# Patient Record
Sex: MALE | Race: AMERICAN INDIAN OR ALASKA NATIVE
[De-identification: names, ages, dates, MRNs, and addresses within clinical notes are randomized per-mention and may not be internally consistent; named-entity substitution may affect disease eponyms.]

---

## 2018-09-26 ENCOUNTER — HOSPITAL ENCOUNTER (EMERGENCY)
Dept: HOSPITAL 14 - H.ER | Age: 28
LOS: 1 days | Discharge: HOME | End: 2018-09-27
Payer: MEDICAID

## 2018-09-26 ENCOUNTER — HOSPITAL ENCOUNTER (EMERGENCY)
Dept: HOSPITAL 14 - H.ER | Age: 28
Discharge: LEFT BEFORE DISCHARGE | End: 2018-09-26
Payer: MEDICAID

## 2018-09-26 VITALS
HEART RATE: 102 BPM | OXYGEN SATURATION: 98 % | SYSTOLIC BLOOD PRESSURE: 119 MMHG | RESPIRATION RATE: 16 BRPM | DIASTOLIC BLOOD PRESSURE: 69 MMHG

## 2018-09-26 VITALS — TEMPERATURE: 98 F

## 2018-09-26 DIAGNOSIS — R10.32: Primary | ICD-10-CM

## 2018-09-26 DIAGNOSIS — R11.10: ICD-10-CM

## 2018-09-26 DIAGNOSIS — R11.2: ICD-10-CM

## 2018-09-26 DIAGNOSIS — R10.9: Primary | ICD-10-CM

## 2018-09-26 DIAGNOSIS — M54.9: ICD-10-CM

## 2018-09-26 PROCEDURE — 96360 HYDRATION IV INFUSION INIT: CPT

## 2018-09-26 PROCEDURE — 81003 URINALYSIS AUTO W/O SCOPE: CPT

## 2018-09-26 PROCEDURE — 74177 CT ABD & PELVIS W/CONTRAST: CPT

## 2018-09-26 PROCEDURE — 83992 ASSAY FOR PHENCYCLIDINE: CPT

## 2018-09-26 PROCEDURE — 80345 DRUG SCREENING BARBITURATES: CPT

## 2018-09-26 PROCEDURE — 99283 EMERGENCY DEPT VISIT LOW MDM: CPT

## 2018-09-26 PROCEDURE — 80346 BENZODIAZEPINES1-12: CPT

## 2018-09-26 PROCEDURE — 80353 DRUG SCREENING COCAINE: CPT

## 2018-09-26 PROCEDURE — 80324 DRUG SCREEN AMPHETAMINES 1/2: CPT

## 2018-09-26 PROCEDURE — 80349 CANNABINOIDS NATURAL: CPT

## 2018-09-26 PROCEDURE — 80361 OPIATES 1 OR MORE: CPT

## 2018-09-26 PROCEDURE — 85025 COMPLETE CBC W/AUTO DIFF WBC: CPT

## 2018-09-26 PROCEDURE — 80053 COMPREHEN METABOLIC PANEL: CPT

## 2018-09-26 PROCEDURE — 80358 DRUG SCREENING METHADONE: CPT

## 2018-09-26 NOTE — ED PDOC
HPI: Abdomen


Time Seen by Provider: 09/26/18 18:30


Chief Complaint (Nursing): Abdominal Pain


Chief Complaint (Provider): Abdominal Pain


History Per: Patient


History/Exam Limitations: no limitations


Onset/Duration Of Symptoms: Hrs (x3)


Additional Complaint(s): 





Patient is a 27 y/o male who presents to the ED for evaluation of intermittent 

abdominal pain with associated non bloody non bilious vomiting since 17:00. 

Patient states he has a history of abdominal hernia and is unsure if the 

symptoms are related. Patient is also complaining of upper back pain that has 

been worsening throughout the week. He denies fall, trauma, fever, chills, 

diarrhea, urinary symptoms, weakness, numbness, cough, shortness of breath, or 

saddle anesthesia. He denies taking meds prior to arrival. 





PMD: None





Past Medical History


Reviewed: Historical Data, Nursing Documentation, Vital Signs


Vital Signs: 





                                Last Vital Signs











Temp  98.0 F   09/26/18 18:05


 


Pulse  102 H  09/26/18 18:05


 


Resp  16   09/26/18 18:05


 


BP  119/69   09/26/18 18:05


 


Pulse Ox  98   09/26/18 18:05














- Medical History


PMH: Asthma





- Surgical History


Other surgeries: ORIF of right hand





- Family History


Family History: States: Unknown Family Hx





- Social History


Current smoker - smoking cessation education provided: Yes (5 cigs per day)


Alcohol: None


Drugs: Denies





- Home Medications


Home Medications: 


                                Ambulatory Orders











 Medication  Instructions  Recorded


 


Dicyclomine [Bentyl] 20 mg PO TID PRN #21 tab 09/27/18


 


Ondansetron ODT [Zofran ODT] 4 mg PO Q8 PRN #10 odt 09/27/18














- Allergies


Allergies/Adverse Reactions: 


                                    Allergies











Allergy/AdvReac Type Severity Reaction Status Date / Time


 


chocolate flavor Allergy  RASH Verified 09/26/18 23:08


 


mushroom Allergy  RASH Verified 09/26/18 23:08


 


onion Allergy  RASH Verified 09/26/18 23:08


 


PORK Allergy  RASH Verified 09/26/18 23:08


 


bee stings Allergy  SWELLING Uncoded 09/26/18 23:08


 


red beans Allergy  RASH Uncoded 09/26/18 23:08














Review of Systems


ROS Statement: Except As Marked, All Systems Reviewed And Found Negative


Constitutional: Negative for: Fever, Chills


Respiratory: Negative for: Cough, Shortness of Breath


Gastrointestinal: Positive for: Vomiting, Abdominal Pain.  Negative for: 

Diarrhea


Genitourinary Male: Negative for: Dysuria, Frequency, Incontinence


Musculoskeletal: Positive for: Back Pain


Neurological: Negative for: Weakness, Numbness





Physical Exam





- Reviewed


Nursing Documentation Reviewed: Yes


Vital Signs Reviewed: Yes





- Physical Exam


Comments: 


GENERAL APPEARANCE: Patient is awake, alert, oriented x 3, is resting 

comfortably and in no distress. 


SKIN:  Warm, dry; (-) cyanosis.


EYES:  (-) conjunctival pallor, (-) scleral icterus.


ENMT:  Mucous membranes moist. Airway patent, (-) stridor. 


NECK: Supple, FROM


CHEST AND RESPIRATORY:  (-) rales, (-) rhonchi, (-) wheezes; breath sounds equal

bilaterally. Respirations nonlabored.


HEART AND CARDIOVASCULAR:  (-) irregularity


ABDOMEN AND GI: Soft (-) distention.  Bowel sounds active x4; (+) tenderness in 

RUQ, LUQ, and epigastrium. (-) guarding, (-) rebound, (-) palpable masses, (-) 

CVA tenderness.


BACK: (+) bilateral parathoracic tenderness. (-) midline tenderness.


EXTREMITIES:  (-) deformity, (-) edema


NEURO AND PSYCH:  Mental status as above; (-) focal findings (-) facial 

asymmetry (-) aphasia. Gait: steady. Speech: clear.





- ECG


O2 Sat by Pulse Oximetry: 98 (RA)


Pulse Ox Interpretation: Normal





Medical Decision Making


Medical Decision Making: 


Time: 18:32


Impression: Abdominal pain, back pain, nausea and vomiting.


Plan:


--IV


--IVF


--CBC


--CMP


--Lipase


--Toradol IVP


--Pepcid IVP


--Zofran IVP


--Re-evaluation








1910


Patient requesting to sign out AMA to make curfew for shelter. Patient declining

all tests at this time and states he will come back tomorrow morning.


Patient signed AMA forms and exited ED with steady gait. Patient AAOx3 upon AMA 

discharge with full mental capacity to make medical decisions. 





This patient is choosing to leave against medical advice. The EP has personally 

explained to


the pt that choosing to do so may result in permanent bodily harm or death. The 

EP discussed at


great length that without further evaluation and monitoring there may be un

foreseen circumstances


and/or deterioration causing permanent bodily harm or death as a result of their

choice. The pt


verbalized these risks back to the physician in laymans terms. The pt is alert,

oriented, and shows


the mental capacity to make clear decisions regarding the pts health care at 

this time. The pt


continues to wish to leave against medical advice.


In light of the pts decision to leave AMA, the pt is aware of the importance of

following up as instructed. 


The pt has been advised that they should return to the


ED immediately if they change their mind at any time, or if their condition 

begins to change or worsen


in any way.


 

--------------------------------------------------------------------------------


-----





Scribe Attestation:


Documented by Matteo Escalona, acting as a scribe for Nevaeh Gonzalez PA-C.





Provider Scribe Attestation:


All medical record entries made by the Scribe were at my direction and 

personally dictated by me. I have reviewed the chart and agree that the record 

accurately reflects my personal performance of the history, physical exam, 

medical decision making, and the department course for this patient. I have also

personally directed, reviewed, and agree with the discharge instructions and 

disposition.





Disposition





- Clinical Impression


Clinical Impression: 


 Abdominal pain, Back pain, Vomiting, Left against medical advice








- Patient ED Disposition


Is Patient to be Admitted: No





- Disposition


Referrals: 


[facility] [Outside]


Disposition: Against Medical Advice


Disposition Time: 19:10


Condition: UNKNOWN


Additional Instructions: 


The emergency medical care you received today was directed at your acute 

symptoms. If you were prescribed any medication, please fill it and take as dire

cted. It may take several days for your symptoms to resolve. Return to the 

Emergency Department if your symptoms worsen, do not improve, or if you have any

 other problems.





Please contact your doctor in 2 days for re-evaluation and follow up / or call 

one of the physicians/clinics you have been referred to that are listed on the 

Patient Visit Information form that is included in your discharge packet. Bring 

any paperwork you were given at discharge with you along with any medications 

you are taking to your follow up visit. Our treatment cannot replace ongoing 

medical care by a primary care provider (PCP) outside of the emergency 

department.


Instructions:  Acute Abdomen (Belly Pain), Upper Back Pain, Nausea and Vomiting,

 Adult, Leaving Against Medical Advice


Forms:  [facility] (English)


Print Language: ENGLISH

## 2018-09-27 VITALS
HEART RATE: 69 BPM | OXYGEN SATURATION: 98 % | DIASTOLIC BLOOD PRESSURE: 74 MMHG | SYSTOLIC BLOOD PRESSURE: 128 MMHG | RESPIRATION RATE: 17 BRPM | TEMPERATURE: 97.8 F

## 2018-09-27 LAB
ALBUMIN SERPL-MCNC: 4.3 G/DL (ref 3.5–5)
ALBUMIN/GLOB SERPL: 1.3 {RATIO} (ref 1–2.1)
ALT SERPL-CCNC: 25 U/L (ref 21–72)
AST SERPL-CCNC: 25 U/L (ref 17–59)
BACTERIA #/AREA URNS HPF: (no result) /[HPF]
BASOPHILS # BLD AUTO: 0.1 K/UL (ref 0–0.2)
BASOPHILS NFR BLD: 1.3 % (ref 0–2)
BILIRUB UR-MCNC: NEGATIVE MG/DL
BUN SERPL-MCNC: 11 MG/DL (ref 9–20)
CALCIUM SERPL-MCNC: 9.7 MG/DL (ref 8.4–10.2)
COLOR UR: YELLOW
EOSINOPHIL # BLD AUTO: 0.2 K/UL (ref 0–0.7)
EOSINOPHIL NFR BLD: 3.7 % (ref 0–4)
ERYTHROCYTE [DISTWIDTH] IN BLOOD BY AUTOMATED COUNT: 13.7 % (ref 11.5–14.5)
GFR NON-AFRICAN AMERICAN: > 60
GLUCOSE UR STRIP-MCNC: (no result) MG/DL
HGB BLD-MCNC: 13.7 G/DL (ref 12–18)
LEUKOCYTE ESTERASE UR-ACNC: (no result) LEU/UL
LYMPHOCYTES # BLD AUTO: 2.8 K/UL (ref 1–4.3)
LYMPHOCYTES NFR BLD AUTO: 49.6 % (ref 20–40)
MCH RBC QN AUTO: 31.9 PG (ref 27–31)
MCHC RBC AUTO-ENTMCNC: 34.4 G/DL (ref 33–37)
MCV RBC AUTO: 92.8 FL (ref 80–94)
MONOCYTES # BLD: 0.5 K/UL (ref 0–0.8)
MONOCYTES NFR BLD: 8.6 % (ref 0–10)
NEUTROPHILS # BLD: 2.1 K/UL (ref 1.8–7)
NEUTROPHILS NFR BLD AUTO: 36.8 % (ref 50–75)
NRBC BLD AUTO-RTO: 0.2 % (ref 0–0)
PH UR STRIP: 6 [PH] (ref 5–8)
PLATELET # BLD: 132 K/UL (ref 130–400)
PMV BLD AUTO: 9.4 FL (ref 7.2–11.7)
PROT UR STRIP-MCNC: 30 MG/DL
RBC # BLD AUTO: 4.31 MIL/UL (ref 4.4–5.9)
RBC # UR STRIP: NEGATIVE /UL
SP GR UR STRIP: 1.04 (ref 1–1.03)
SQUAMOUS EPITHIAL: < 1 /HPF (ref 0–5)
URINE CLARITY: (no result)
UROBILINOGEN UR-MCNC: 4 MG/DL (ref 0.2–1)
WBC # BLD AUTO: 5.6 K/UL (ref 4.8–10.8)

## 2018-09-27 NOTE — ED PDOC
Addendum entered and electronically signed by Nevaeh Gonzalez RPA-C  

09/29/18 13:54: 








Addendum


Addendum: 





09/29/18 13:53


Patient reached at [facility] [phone]. Patient advised to return 

to ED for further evaluation. 





Addendum entered and electronically signed by Nevaeh Gonzalez RPA-C  

09/29/18 13:13: 








Addendum


Addendum: 





09/29/18 13:13


 


Official CT read:





Date of service: 


09/27/2018


PROCEDURE:  CT Abdomen and Pelvis with contrast


HISTORY:


left lower abdominal pain


COMPARISON:


None.


TECHNIQUE:


Contrast dose: 90 mL Omnipaque 300


Radiation dose:


Total exam DLP = 200.6 mGy-cm.


This CT exam was performed using one or more of the following dose reduction 

techniques: Automated exposure control, adjustment of the mA and/or kV according

to patient size, and/or use of iterative reconstruction technique.


FINDINGS:


LOWER THORAX:


Unremarkable. 


LIVER:


Diffuse hepatic steatosis.  No gross lesion or ductal dilatation. 


GALLBLADDER AND BILE DUCTS:


Unremarkable. 


PANCREAS:


Unremarkable. No gross lesion or ductal dilatation.


SPLEEN:


Unremarkable. 


ADRENALS:


Unremarkable. No mass. 


KIDNEYS AND URETERS:


Unremarkable. No hydronephrosis. No solid mass. 


VASCULATURE:


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  Prominent left renal vein with 

collateral drainage via lumbar veins.  No aortic aneurysm. 


BOWEL:


Markedly dilated 2nd portion of the duodenum measuring 5.1 cm in diameter.  No 

obstruction. No gross mural thickening. 


APPENDIX:


Normal appendix. 


PERITONEUM:


Unremarkable. No free fluid. No free air. 


LYMPH NODES:


Unremarkable. No enlarged lymph nodes. 


BLADDER:


Unremarkable. 


REPRODUCTIVE:


Unremarkable. 


BONES:


No acute fracture. 


OTHER FINDINGS:


None.


IMPRESSION:


Markedly dilated 2nd portion of duodenum measuring 5.1 cm with extrinsic 

compression on the 3rd part of the duodenum secondary to narrow angle between 

the SMA and aorta.  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


Prominent left renal vein with collateral drainage via lumbar veins, likely also

related to narrow angle between the SMA and aorta.


These findings were not conveyed on the preliminary interpretation by 

[facility].  ER notification submitted electronically.





Patient with no contact number in system. Letter will be sent to address on file

however in previous ED visit patient states he was homeless, so address may be 

invalid.





Original Note:








HPI: Abdomen


Time Seen by Provider: 09/27/18 00:00


Chief Complaint (Nursing): Abdominal Pain


Chief Complaint (Provider): abdominal pain


History Per: Patient


History/Exam Limitations: no limitations


Onset/Duration Of Symptoms: Days (1)


Current Symptoms Are (Timing): Still Present


Location Of Pain/Discomfort: LLQ


Associated Symptoms: Nausea, Vomiting


Additional Complaint(s): 





29 y/o male presents for evaluation of left lower abdominal pain x 1 day.  

Associated multiple episodes of nonbilious, nonbloody vomiting.  Denies fever, 

cough, chest pain, shortness of breath, palpitations, changes in bowel 

movements, urinary symptoms, testicular pain/swelling. 














Past Medical History


Reviewed: Historical Data, Nursing Documentation, Vital Signs


Vital Signs: 





                                Last Vital Signs











Temp  98 F   09/26/18 23:08


 


Pulse  78   09/26/18 23:08


 


Resp  16   09/26/18 23:08


 


BP  122/77   09/26/18 23:08


 


Pulse Ox  97   09/26/18 23:08














- Medical History


PMH: Asthma





- Family History


Family History: States: No Known Family Hx





- Living Arrangements


Living Arrangements: Alone (homeless)





- Home Medications


Home Medications: 


                                Ambulatory Orders











 Medication  Instructions  Recorded


 


Dicyclomine [Bentyl] 20 mg PO TID PRN #21 tab 09/27/18


 


Ondansetron ODT [Zofran ODT] 4 mg PO Q8 PRN #10 odt 09/27/18














- Allergies


Allergies/Adverse Reactions: 


                                    Allergies











Allergy/AdvReac Type Severity Reaction Status Date / Time


 


chocolate flavor Allergy  RASH Verified 09/26/18 23:08


 


mushroom Allergy  RASH Verified 09/26/18 23:08


 


onion Allergy  RASH Verified 09/26/18 23:08


 


PORK Allergy  RASH Verified 09/26/18 23:08


 


bee stings Allergy  SWELLING Uncoded 09/26/18 23:08


 


red beans Allergy  RASH Uncoded 09/26/18 23:08














Review of Systems


ROS Statement: Except As Marked, All Systems Reviewed And Found Negative


Gastrointestinal: Positive for: Nausea, Vomiting, Abdominal Pain


Musculoskeletal: Positive for: Shoulder Pain





Physical Exam





- Reviewed


Nursing Documentation Reviewed: Yes


Vital Signs Reviewed: Yes





- Physical Exam


Appears: Positive for: Well, Non-toxic, No Acute Distress


Head Exam: Positive for: ATRAUMATIC, NORMAL INSPECTION, NORMOCEPHALIC


Skin: Positive for: Normal Color


Eye Exam: Positive for: Normal appearance


ENT: Positive for: Normal ENT Inspection


Cardiovascular/Chest: Positive for: Regular Rate, Rhythm


Respiratory: Positive for: Normal Breath Sounds


Gastrointestinal/Abdominal: Positive for: Bowel Sounds, Soft, Tenderness (LLQ)


Back: Positive for: Normal Inspection


Extremity: Positive for: Normal ROM, Capillary Refill (<2 sec b/l UE).  Negative

for: Tenderness, Deformity, Swelling


Neurologic/Psych: Positive for: Alert, Oriented (x3)





- Laboratory Results


Result Diagrams: 


                                 09/27/18 00:39





                                 09/27/18 00:39





- ECG


O2 Sat by Pulse Oximetry: 97





- Progress


ED Course And Treament: 





labs, urine, CT abd/pelvis, IV toradol, IV fluids, IV zofran





USArad impression: mild apparent thicking of the sigmoid colon.  Underdistenti

on, spasm, versus mild colitis





On re-eval, patient sleeping; upon awakening states he is feeling better.  

Tolerated PO


Patient educated on findings, discharged with rx zofran, bentyl


Advised follow up PMD 2-3 days


Return precautions given





Patient demonstrates full understanding of discharge instructions


Patient requires no further intervention in the ED and is stable for discharge 

at this time





Disposition





- Clinical Impression


Clinical Impression: 


 Abdominal pain








- Patient ED Disposition


Is Patient to be Admitted: No





- Disposition


Referrals: 


[facility] [Outside]


Disposition: Routine/Home


Disposition Time: 03:33


Condition: IMPROVED


Prescriptions: 


Dicyclomine [Bentyl] 20 mg PO TID PRN #21 tab


 PRN Reason: Pain, Mild (1-3)


Ondansetron ODT [Zofran ODT] 4 mg PO Q8 PRN #10 odt


 PRN Reason: Nausea/Vomiting


Instructions:  Acute Abdomen (Belly Pain)

## 2018-09-30 ENCOUNTER — HOSPITAL ENCOUNTER (EMERGENCY)
Dept: HOSPITAL 14 - H.ER | Age: 28
Discharge: HOME | End: 2018-09-30
Payer: MEDICAID

## 2018-09-30 VITALS
DIASTOLIC BLOOD PRESSURE: 78 MMHG | SYSTOLIC BLOOD PRESSURE: 113 MMHG | HEART RATE: 60 BPM | TEMPERATURE: 98.4 F | RESPIRATION RATE: 14 BRPM

## 2018-09-30 VITALS — BODY MASS INDEX: 17.4 KG/M2

## 2018-09-30 VITALS — OXYGEN SATURATION: 100 %

## 2018-09-30 DIAGNOSIS — M25.512: ICD-10-CM

## 2018-09-30 DIAGNOSIS — R10.9: Primary | ICD-10-CM

## 2018-09-30 LAB
ALBUMIN SERPL-MCNC: 4.1 G/DL (ref 3.5–5)
ALBUMIN/GLOB SERPL: 1.3 {RATIO} (ref 1–2.1)
ALT SERPL-CCNC: 24 U/L (ref 21–72)
AMYLASE SERPL-CCNC: 100 U/L (ref 30–110)
AST SERPL-CCNC: 22 U/L (ref 17–59)
BASE EXCESS BLDV CALC-SCNC: 0.3 MMOL/L (ref 0–2)
BASOPHILS # BLD AUTO: 0 K/UL (ref 0–0.2)
BASOPHILS NFR BLD: 1.1 % (ref 0–2)
BILIRUB UR-MCNC: NEGATIVE MG/DL
BUN SERPL-MCNC: 9 MG/DL (ref 9–20)
CALCIUM SERPL-MCNC: 9.4 MG/DL (ref 8.4–10.2)
COLOR UR: (no result)
EOSINOPHIL # BLD AUTO: 0.3 K/UL (ref 0–0.7)
EOSINOPHIL NFR BLD: 6.3 % (ref 0–4)
ERYTHROCYTE [DISTWIDTH] IN BLOOD BY AUTOMATED COUNT: 13.8 % (ref 11.5–14.5)
GFR NON-AFRICAN AMERICAN: > 60
GLUCOSE UR STRIP-MCNC: (no result) MG/DL
HGB BLD-MCNC: 13.8 G/DL (ref 12–18)
LEUKOCYTE ESTERASE UR-ACNC: (no result) LEU/UL
LIPASE SERPL-CCNC: 65 U/L (ref 23–300)
LYMPHOCYTES # BLD AUTO: 2.2 K/UL (ref 1–4.3)
LYMPHOCYTES NFR BLD AUTO: 54 % (ref 20–40)
MCH RBC QN AUTO: 31.6 PG (ref 27–31)
MCHC RBC AUTO-ENTMCNC: 33.8 G/DL (ref 33–37)
MCV RBC AUTO: 93.3 FL (ref 80–94)
MONOCYTES # BLD: 0.4 K/UL (ref 0–0.8)
MONOCYTES NFR BLD: 9.3 % (ref 0–10)
NEUTROPHILS # BLD: 1.2 K/UL (ref 1.8–7)
NEUTROPHILS NFR BLD AUTO: 29.3 % (ref 50–75)
NRBC BLD AUTO-RTO: 0.3 % (ref 0–0)
PCO2 BLDV: 42 MMHG (ref 40–60)
PH BLDV: 7.39 [PH] (ref 7.32–7.43)
PH UR STRIP: 8 [PH] (ref 5–8)
PLATELET # BLD: 121 K/UL (ref 130–400)
PMV BLD AUTO: 9.9 FL (ref 7.2–11.7)
PROT UR STRIP-MCNC: NEGATIVE MG/DL
RBC # BLD AUTO: 4.36 MIL/UL (ref 4.4–5.9)
RBC # UR STRIP: NEGATIVE /UL
SP GR UR STRIP: 1.01 (ref 1–1.03)
URINE CLARITY: CLEAR
UROBILINOGEN UR-MCNC: (no result) MG/DL (ref 0.2–1)
VENOUS BLOOD FIO2: 21 %
VENOUS BLOOD GAS PO2: 56 MM/HG (ref 30–55)
WBC # BLD AUTO: 4 K/UL (ref 4.8–10.8)

## 2018-09-30 PROCEDURE — 80361 OPIATES 1 OR MORE: CPT

## 2018-09-30 PROCEDURE — 83690 ASSAY OF LIPASE: CPT

## 2018-09-30 PROCEDURE — 96376 TX/PRO/DX INJ SAME DRUG ADON: CPT

## 2018-09-30 PROCEDURE — 96374 THER/PROPH/DIAG INJ IV PUSH: CPT

## 2018-09-30 PROCEDURE — 81003 URINALYSIS AUTO W/O SCOPE: CPT

## 2018-09-30 PROCEDURE — 85025 COMPLETE CBC W/AUTO DIFF WBC: CPT

## 2018-09-30 PROCEDURE — 80349 CANNABINOIDS NATURAL: CPT

## 2018-09-30 PROCEDURE — 80353 DRUG SCREENING COCAINE: CPT

## 2018-09-30 PROCEDURE — 80346 BENZODIAZEPINES1-12: CPT

## 2018-09-30 PROCEDURE — 80053 COMPREHEN METABOLIC PANEL: CPT

## 2018-09-30 PROCEDURE — 99283 EMERGENCY DEPT VISIT LOW MDM: CPT

## 2018-09-30 PROCEDURE — 83992 ASSAY FOR PHENCYCLIDINE: CPT

## 2018-09-30 PROCEDURE — 73030 X-RAY EXAM OF SHOULDER: CPT

## 2018-09-30 PROCEDURE — 80324 DRUG SCREEN AMPHETAMINES 1/2: CPT

## 2018-09-30 PROCEDURE — 82150 ASSAY OF AMYLASE: CPT

## 2018-09-30 PROCEDURE — 96375 TX/PRO/DX INJ NEW DRUG ADDON: CPT

## 2018-09-30 PROCEDURE — 82803 BLOOD GASES ANY COMBINATION: CPT

## 2018-09-30 PROCEDURE — 80358 DRUG SCREENING METHADONE: CPT

## 2018-09-30 PROCEDURE — 71045 X-RAY EXAM CHEST 1 VIEW: CPT

## 2018-09-30 PROCEDURE — 80345 DRUG SCREENING BARBITURATES: CPT

## 2018-09-30 NOTE — RAD
Date of service: 



09/30/2018



PROCEDURE:  CHEST RADIOGRAPH, 1 VIEW



HISTORY:

upper chest shoulder pain



COMPARISON:

None available.



FINDINGS:



LUNGS:

Clear.



PLEURA:

No pneumothorax or pleural fluid seen.



CARDIOVASCULAR:

Normal.



OSSEOUS STRUCTURES:

No significant abnormalities.



VISUALIZED UPPER ABDOMEN:

Normal.



OTHER FINDINGS:

None. 



IMPRESSION:

No active disease.

## 2018-09-30 NOTE — CP.PCM.CON
History of Present Illness





- History of Present Illness


History of Present Illness: 


General Surgery Consult


Re: possible SMA syndrome





HPI: 28 M presented to the ED today after a call back yesterday afternoon due to

new read about CT findings of possible SMA syndrome. Pt initially seen and 

evaluated here in the ED on 9/26/18 for abdominal pain. Currently, his LLQ pain 

improved and did not completely resolve, however, he has had this issue since 

his appendix was removed. Now, he is more concerned about his L shoulder pain 

which is described as a 10/10 with movement. Denies nausea, vomiting, diarrhea 

or constipation, no fever or chills. 





PMH: Denies


PSH: Lap appendectomy, R hand ORIF


FH: unknown


SH: Smokes tobacco, no EtOH, no drug use (per pt) last UDS screening showed 

+THC, Homeless


All: Denies medication allergies


Meds: Denies








Review of Systems





- Review of Systems


All systems: reviewed and no additional remarkable complaints except (as per 

HPI)





Past Patient History





- Past Social History


Alcohol: None


Drugs: Cannabis





- PULMONARY


Hx Asthma: Yes





- MUSCULOSKELETAL/RHEUMATOLOGICAL


Hx Fractures: Yes (Right hand)





- GASTROINTESTINAL


Hx Gastrointestinal Disorders: Yes


Other/Comment: Hx Left inguinal hernia





- PSYCHIATRIC


Hx Substance Use: Yes





- SURGICAL HISTORY


Hx Surgeries: Yes


Hx Open Reduction Internal Fixation: Yes (Right hand)


Other/Comment: hand surgery





- ANESTHESIA


Hx Anesthesia: Yes


Hx Anesthesia Reactions: No


Hx Malignant Hyperthermia: No





Meds


Home Medications: 


                              Home Medication List











 Medication  Instructions  Recorded  Confirmed  Type


 


Dicyclomine [Bentyl] 10 mg PO QID PRN #10 cap 09/30/18  Rx











Allergies/Adverse Reactions: 


                                    Allergies











Allergy/AdvReac Type Severity Reaction Status Date / Time


 


chocolate flavor Allergy  RASH Verified 09/26/18 23:08


 


mushroom Allergy  RASH Verified 09/26/18 23:08


 


onion Allergy  RASH Verified 09/26/18 23:08


 


PORK Allergy  RASH Verified 09/26/18 23:08


 


bee stings Allergy  SWELLING Uncoded 09/26/18 23:08


 


red beans Allergy  RASH Uncoded 09/26/18 23:08














Physical Exam





- Constitutional


Appears: Non-toxic, No Acute Distress





- Head Exam


Head Exam: ATRAUMATIC, NORMOCEPHALIC





- Eye Exam


Eye Exam: EOMI.  absent: Scleral icterus





- ENT Exam


ENT Exam: Mucous Membranes Moist


Additional comments: 





trachea midline





- Neck Exam


Neck exam: Positive for: Full Rom.  Negative for: Tenderness





- Respiratory Exam


Respiratory Exam: NORMAL BREATHING PATTERN.  absent: Respiratory Distress





- Cardiovascular Exam


Cardiovascular Exam: RRR, +S1, +S2.  absent: Bradycardia, Tachycardia





- GI/Abdominal Exam


GI & Abdominal Exam: Soft, Tenderness (in LLQ over scar from laparoscopic 

appendectomy).  absent: Distended, Firm, Guarding, Hernia, Rigid





- Rectal Exam


Rectal Exam: Deferred





- Extremities Exam


Extremities exam: Negative for: calf tenderness, pedal edema


Additional comments: 


L shoulder joint pain with active ROM





- Back Exam


Back exam: absent: CVA tenderness (L), CVA tenderness (R)





- Neurological Exam


Neurological exam: Alert, Oriented x3





- Skin


Skin Exam: Dry, Warm





Results





- Vital Signs


Recent Vital Signs: 


                                Last Vital Signs











Temp  98.4 F   09/30/18 16:39


 


Pulse  60   09/30/18 16:39


 


Resp  14   09/30/18 16:39


 


BP  113/78   09/30/18 16:39


 


Pulse Ox  100   09/30/18 16:57














- Labs


Result Diagrams: 


                                 09/30/18 11:09





                                 09/30/18 11:09


Labs: 


                         Laboratory Results - last 24 hr











  09/30/18 09/30/18 09/30/18





  11:09 11:09 11:09


 


WBC   4.0 L 


 


RBC   4.36 L 


 


Hgb   13.8 


 


Hct   40.7 


 


MCV   93.3 


 


MCH   31.6 H 


 


MCHC   33.8 


 


RDW   13.8 


 


Plt Count   121 L 


 


MPV   9.9 


 


Neut % (Auto)   29.3 L 


 


Lymph % (Auto)   54.0 H 


 


Mono % (Auto)   9.3 


 


Eos % (Auto)   6.3 H 


 


Baso % (Auto)   1.1 


 


Neut # (Auto)   1.2 L 


 


Lymph # (Auto)   2.2 


 


Mono # (Auto)   0.4 


 


Eos # (Auto)   0.3 


 


Baso # (Auto)   0.0 


 


pO2   


 


VBG pH   


 


VBG pCO2   


 


VBG HCO3   


 


VBG Total CO2   


 


VBG O2 Sat (Calc)   


 


VBG Base Excess   


 


VBG Potassium   


 


Glucose   


 


Lactate   


 


FiO2   


 


Sodium  142  


 


Potassium  4.2  


 


Chloride  108 H  


 


Carbon Dioxide  24  


 


Anion Gap  14  


 


BUN  9  


 


Creatinine  0.9  


 


Est GFR ( Amer)  > 60  


 


Est GFR (Non-Af Amer)  > 60  


 


Random Glucose  74 L  


 


Calcium  9.4  


 


Total Bilirubin  0.6  


 


AST  22  


 


ALT  24  


 


Alkaline Phosphatase  49  


 


Total Protein  7.3  


 


Albumin  4.1  


 


Globulin  3.2  


 


Albumin/Globulin Ratio  1.3  


 


Amylase  100  


 


Lipase  65  


 


Venous Blood Potassium   


 


Urine Color   


 


Urine Clarity   


 


Urine pH   


 


Ur Specific Gravity   


 


Urine Protein   


 


Urine Glucose (UA)   


 


Urine Ketones   


 


Urine Blood   


 


Urine Nitrate   


 


Urine Bilirubin   


 


Urine Urobilinogen   


 


Ur Leukocyte Esterase   


 


Urine RBC (Auto)   


 


Urine Microscopic WBC   


 


Urine Opiates Screen    Negative


 


Urine Methadone Screen    Negative


 


Ur Barbiturates Screen    Negative


 


Ur Phencyclidine Scrn    Negative


 


Ur Amphetamines Screen    Negative


 


U Benzodiazepines Scrn    Negative


 


U Oth Cocaine Metabols    Negative


 


U Cannabinoids Screen    Negative














  09/30/18 09/30/18





  11:10 13:04


 


WBC  


 


RBC  


 


Hgb  


 


Hct  


 


MCV  


 


MCH  


 


MCHC  


 


RDW  


 


Plt Count  


 


MPV  


 


Neut % (Auto)  


 


Lymph % (Auto)  


 


Mono % (Auto)  


 


Eos % (Auto)  


 


Baso % (Auto)  


 


Neut # (Auto)  


 


Lymph # (Auto)  


 


Mono # (Auto)  


 


Eos # (Auto)  


 


Baso # (Auto)  


 


pO2   56 H


 


VBG pH   7.39


 


VBG pCO2   42


 


VBG HCO3   24.9


 


VBG Total CO2   26.7


 


VBG O2 Sat (Calc)   93.5 H


 


VBG Base Excess   0.3


 


VBG Potassium   4.1


 


Glucose   84


 


Lactate   0.8


 


FiO2   21.0


 


Sodium   137.0


 


Potassium  


 


Chloride   106.0


 


Carbon Dioxide  


 


Anion Gap  


 


BUN  


 


Creatinine  


 


Est GFR ( Amer)  


 


Est GFR (Non-Af Amer)  


 


Random Glucose  


 


Calcium  


 


Total Bilirubin  


 


AST  


 


ALT  


 


Alkaline Phosphatase  


 


Total Protein  


 


Albumin  


 


Globulin  


 


Albumin/Globulin Ratio  


 


Amylase  


 


Lipase  


 


Venous Blood Potassium   4.1


 


Urine Color  Straw 


 


Urine Clarity  Clear 


 


Urine pH  8.0 


 


Ur Specific Gravity  1.006 


 


Urine Protein  Negative 


 


Urine Glucose (UA)  Neg 


 


Urine Ketones  Negative 


 


Urine Blood  Negative 


 


Urine Nitrate  Negative 


 


Urine Bilirubin  Negative 


 


Urine Urobilinogen  0.2-1.0 


 


Ur Leukocyte Esterase  Neg 


 


Urine RBC (Auto)  < 1 


 


Urine Microscopic WBC  < 1 


 


Urine Opiates Screen  


 


Urine Methadone Screen  


 


Ur Barbiturates Screen  


 


Ur Phencyclidine Scrn  


 


Ur Amphetamines Screen  


 


U Benzodiazepines Scrn  


 


U Oth Cocaine Metabols  


 


U Cannabinoids Screen  














- Imaging and Cardiology


  ** CT scan - abdomen


Status: Image reviewed by me, Report reviewed by me





Assessment & Plan





- Assessment and Plan (Free Text)


Assessment: 


28M with Hx of abdominal pain in LLQ


Plan: 


No signs of sepsis or acute bowel issues, Pt complaining most about his L 

shoulder pain. 


No surgical intervention required at this time, will need outpatient GI workup 

for question of SMA syndrome


Pt not interested in surgical options at this time.


D/W Dr. Ismael Cortez PGY4

## 2018-09-30 NOTE — RAD
Date of service: 



09/30/2018



PROCEDURE:  Radiographs of the Left Shoulder



HISTORY:

upper shoulder pain







COMPARISON:

No prior.



FINDINGS:



BONES:

No acute fracture.



JOINTS:

Unremarkable.



SOFT TISSUES:

Normal.



OTHER FINDINGS:

None. 



IMPRESSION:

No demonstrated fracture or dislocation.

## 2018-09-30 NOTE — ED PDOC
HPI: Abdomen


Chief Complaint (Provider): ABdominal pain


History Per: Patient


Additional Complaint(s): 





Pt is a 29 yo male, no PMH, presents to the ED after receiving a callback. Pt 

was seen and evaluated here in the ED on 9/26/18 for abdominal pain. Pt at this 

time reports pain continues, no nausea, vomiting, diarrhea or constipation, no 

fever or chills. Official CT was read as:09/29/18 13:13


 


Official CT read:





Date of service: 


09/27/2018


PROCEDURE:  CT Abdomen and Pelvis with contrast


HISTORY:


left lower abdominal pain


COMPARISON:


None.


TECHNIQUE:


Contrast dose: 90 mL Omnipaque 300


Radiation dose:


Total exam DLP = 200.6 mGy-cm.


This CT exam was performed using one or more of the following dose reduction 

techniques: Automated exposure control, adjustment of the mA and/or kV according

to patient size, and/or use of iterative reconstruction technique.


FINDINGS:


LOWER THORAX:


Unremarkable. 


LIVER:


Diffuse hepatic steatosis.  No gross lesion or ductal dilatation. 


GALLBLADDER AND BILE DUCTS:


Unremarkable. 


PANCREAS:


Unremarkable. No gross lesion or ductal dilatation.


SPLEEN:


Unremarkable. 


ADRENALS:


Unremarkable. No mass. 


KIDNEYS AND URETERS:


Unremarkable. No hydronephrosis. No solid mass. 


VASCULATURE:


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  Prominent left renal vein with 

collateral drainage via lumbar veins.  No aortic aneurysm. 


BOWEL:


Markedly dilated 2nd portion of the duodenum measuring 5.1 cm in diameter.  No 

obstruction. No gross mural thickening. 


APPENDIX:


Normal appendix. 


PERITONEUM:


Unremarkable. No free fluid. No free air. 


LYMPH NODES:


Unremarkable. No enlarged lymph nodes. 


BLADDER:


Unremarkable. 


REPRODUCTIVE:


Unremarkable. 


BONES:


No acute fracture. 


OTHER FINDINGS:


None.


IMPRESSION:


Markedly dilated 2nd portion of duodenum measuring 5.1 cm with extrinsic 

compression on the 3rd part of the duodenum secondary to narrow angle between 

the SMA and aorta.  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


Prominent left renal vein with collateral drainage via lumbar veins, likely also

related to narrow angle between the SMA and aorta.


These findings were not conveyed on the preliminary interpretation by 

[facility].  ER notification submitted electronically.








<Charmaine Lopez - Last Filed: 09/30/18 16:53>





<Barry Belle III - Last Filed: 10/02/18 13:58>


Time Seen by Provider: 09/30/18 09:59


Chief Complaint (Nursing): Abdominal Pain





Supervising Attending Note





- Attestation:


I have reviewed all pertinent clinical information: Yes





<Barry Belle III - Last Filed: 10/02/18 13:58>





Past Medical History


Reviewed: Nursing Documentation, Vital Signs


Vital Signs: 





                                Last Vital Signs











Temp  97.9 F   09/30/18 09:58


 


Pulse  70   09/30/18 09:58


 


Resp  18   09/30/18 09:58


 


BP  155/72 H  09/30/18 09:58


 


Pulse Ox  100   09/30/18 09:58














- Medical History


PMH: Asthma, Fractures (Right hand)





- Surgical History


Surgical History: No Surg Hx





- Family History


Family History: States: No Known Family Hx





- Living Arrangements


Living Arrangements: Other





- Social History


Current smoker - smoking cessation education provided: No


Alcohol: None


Drugs: Cannabis





<Petronella,Charmaine A - Last Filed: 09/30/18 16:53>


Vital Signs: 





                                Last Vital Signs











Temp  98.4 F   09/30/18 16:39


 


Pulse  60   09/30/18 16:39


 


Resp  14   09/30/18 16:39


 


BP  113/78   09/30/18 16:39


 


Pulse Ox  100   09/30/18 16:57














<Barry Belle III - Last Filed: 10/02/18 13:58>





- Home Medications


Home Medications: 


                                Ambulatory Orders











 Medication  Instructions  Recorded


 


Dicyclomine [Bentyl] 20 mg PO TID PRN #21 tab 09/27/18


 


Ondansetron ODT [Zofran ODT] 4 mg PO Q8 PRN #10 odt 09/27/18


 


Dicyclomine [Bentyl] 10 mg PO QID PRN #10 cap 09/30/18














- Allergies


Allergies/Adverse Reactions: 


                                    Allergies











Allergy/AdvReac Type Severity Reaction Status Date / Time


 


chocolate flavor Allergy  RASH Verified 09/26/18 23:08


 


mushroom Allergy  RASH Verified 09/26/18 23:08


 


onion Allergy  RASH Verified 09/26/18 23:08


 


PORK Allergy  RASH Verified 09/26/18 23:08


 


bee stings Allergy  SWELLING Uncoded 09/26/18 23:08


 


red beans Allergy  RASH Uncoded 09/26/18 23:08














Review of Systems


ROS Statement: Except As Marked, All Systems Reviewed And Found Negative


Gastrointestinal: Positive for: Abdominal Pain





<Petronella,Charmaine A - Last Filed: 09/30/18 16:53>





Physical Exam





- Reviewed


Nursing Documentation Reviewed: Yes


Vital Signs Reviewed: Yes





- Physical Exam


Appears: Positive for: Well, Non-toxic, No Acute Distress


Head Exam: Positive for: ATRAUMATIC, NORMAL INSPECTION, NORMOCEPHALIC


Skin: Positive for: Normal Color, Warm, DRY


Eye Exam: Positive for: EOMI, Normal appearance, PERRL


ENT: Positive for: Normal ENT Inspection


Neck: Positive for: Normal, Painless ROM


Cardiovascular/Chest: Positive for: Regular Rate, Rhythm


Respiratory: Positive for: CNT, Normal Breath Sounds


Gastrointestinal/Abdominal: Positive for: Normal Exam, Soft


Back: Positive for: Normal Inspection


Extremity: Positive for: Normal ROM


Neurologic/Psych: Positive for: Alert, Oriented





<Charmaine Lopez Last Filed: 09/30/18 16:53>





- Laboratory Results


Result Diagrams: 


                                 09/30/18 11:09





                                 09/30/18 11:09





- ECG


O2 Sat by Pulse Oximetry: 100





<Charmaine Lopez Last Filed: 09/30/18 16:53>





- Laboratory Results


Result Diagrams: 


                                 09/30/18 11:09





                                 09/30/18 11:09





<Barry Belle III - Last Filed: 10/02/18 13:58>





Medical Decision Making


Medical Decision Making: 








IV access established and Pts Pain controlled with zofran and morphine





diagnostics reviewed with Pt





case d/w GI fellow Dr. Guevara, who advised surgical consult at this time





Surgical resident presented to see and evaluate Pt at bedside. Advised pt stable

 for outpt follow up with GI at this time. 








Pt doing well on re-eval, stable for discharge at this time








<Charmaine Lopez Last Filed: 09/30/18 16:53>





Disposition





- Patient ED Disposition


Is Patient to be Admitted: No





- Disposition


Disposition: Routine/Home


Disposition Time: 16:57





<Charmaine Lopez Last Filed: 09/30/18 16:53>





<Barry Belle III - Last Filed: 10/02/18 13:58>





- Clinical Impression


Clinical Impression: 


 Abdominal pain, Shoulder pain








- Disposition


Referrals: 


Barry Loza MD, PhD [Staff Provider] - 


[facility] [Outside]


Condition: STABLE


Prescriptions: 


Dicyclomine [Bentyl] 10 mg PO QID PRN #10 cap


 PRN Reason: Pain, Mild (1-3)


Instructions:  Acute Abdomen (Belly Pain), Adult (DC), Shoulder Pain (DC)


Forms:  [employer] (English)

## 2018-11-01 ENCOUNTER — HOSPITAL ENCOUNTER (EMERGENCY)
Dept: HOSPITAL 14 - H.ER | Age: 28
Discharge: HOME | End: 2018-11-01
Payer: MEDICAID

## 2018-11-01 VITALS — TEMPERATURE: 97.9 F | RESPIRATION RATE: 18 BRPM | OXYGEN SATURATION: 99 %

## 2018-11-01 VITALS — DIASTOLIC BLOOD PRESSURE: 74 MMHG | SYSTOLIC BLOOD PRESSURE: 116 MMHG | HEART RATE: 59 BPM

## 2018-11-01 VITALS — BODY MASS INDEX: 17.4 KG/M2

## 2018-11-01 DIAGNOSIS — R07.1: Primary | ICD-10-CM

## 2018-11-01 LAB
ALBUMIN SERPL-MCNC: 4.4 G/DL (ref 3.5–5)
ALBUMIN/GLOB SERPL: 1.3 {RATIO} (ref 1–2.1)
ALT SERPL-CCNC: 15 U/L (ref 21–72)
AST SERPL-CCNC: 36 U/L (ref 17–59)
BASOPHILS # BLD AUTO: 0 K/UL (ref 0–0.2)
BASOPHILS NFR BLD: 0.9 % (ref 0–2)
BUN SERPL-MCNC: 14 MG/DL (ref 9–20)
CALCIUM SERPL-MCNC: 8.8 MG/DL (ref 8.4–10.2)
EOSINOPHIL # BLD AUTO: 0.2 K/UL (ref 0–0.7)
EOSINOPHIL NFR BLD: 6 % (ref 0–4)
ERYTHROCYTE [DISTWIDTH] IN BLOOD BY AUTOMATED COUNT: 13.4 % (ref 11.5–14.5)
GFR NON-AFRICAN AMERICAN: > 60
HGB BLD-MCNC: 12.8 G/DL (ref 12–18)
LYMPHOCYTES # BLD AUTO: 2 K/UL (ref 1–4.3)
LYMPHOCYTES NFR BLD AUTO: 57 % (ref 20–40)
MCH RBC QN AUTO: 31.2 PG (ref 27–31)
MCHC RBC AUTO-ENTMCNC: 33.2 G/DL (ref 33–37)
MCV RBC AUTO: 94.1 FL (ref 80–94)
MONOCYTES # BLD: 0.3 K/UL (ref 0–0.8)
MONOCYTES NFR BLD: 8.7 % (ref 0–10)
NEUTROPHILS # BLD: 1 K/UL (ref 1.8–7)
NEUTROPHILS NFR BLD AUTO: 27.4 % (ref 50–75)
NRBC BLD AUTO-RTO: 0.3 % (ref 0–0)
PLATELET # BLD: 165 K/UL (ref 130–400)
PMV BLD AUTO: 10.5 FL (ref 7.2–11.7)
RBC # BLD AUTO: 4.1 MIL/UL (ref 4.4–5.9)
WBC # BLD AUTO: 3.5 K/UL (ref 4.8–10.8)

## 2018-11-01 NOTE — ED PDOC
HPI: Chest Pain


Time Seen by Provider: 11/01/18 18:46


Chief Complaint (Nursing): Chest Pain


History Per: Patient


Onset/Duration Of Symptoms: Other (Few weeks)


Current Symptoms Are (Timing): Intermittent Episodes


Severity: Mild


Quality: Sharp


Exacerbating Factors: Movement, Deep Breathing


Additional Complaint(s): 





Sharp intermittent chest pain over past few weeks. Worse on movement and deep 

inspiration. Denies cough or SOB. Referred by PMD due to abnormal EKG.





Past Medical History


Vital Signs: 





                                Last Vital Signs











Temp  97.9 F   11/01/18 18:24


 


Pulse  77   11/01/18 18:24


 


Resp  18   11/01/18 18:24


 


BP  111/66   11/01/18 18:24


 


Pulse Ox  99   11/01/18 18:24














- Medical History


PMH: Asthma, Fractures (Right hand)





- Family History


Family History: States: Unknown Family Hx





- Home Medications


Home Medications: 


                                Ambulatory Orders











 Medication  Instructions  Recorded


 


Dicyclomine [Bentyl] 20 mg PO TID PRN #21 tab 09/27/18


 


Ondansetron ODT [Zofran ODT] 4 mg PO Q8 PRN #10 odt 09/27/18


 


Dicyclomine [Bentyl] 10 mg PO QID PRN #10 cap 09/30/18


 


Naproxen [Naprosyn] 500 mg PO Q12H #20 tab 11/01/18














- Allergies


Allergies/Adverse Reactions: 


                                    Allergies











Allergy/AdvReac Type Severity Reaction Status Date / Time


 


chocolate flavor Allergy  RASH Verified 11/01/18 18:24


 


mushroom Allergy  RASH Verified 11/01/18 18:24


 


onion Allergy  RASH Verified 11/01/18 18:24


 


PORK Allergy  RASH Verified 11/01/18 18:24


 


bee stings Allergy  SWELLING Uncoded 11/01/18 18:24


 


red beans Allergy  RASH Uncoded 11/01/18 18:24














Review of Systems


ROS Statement: Except As Marked, All Systems Reviewed And Found Negative


Cardiovascular: Positive for: Chest Pain





Physical Exam





- Reviewed


Nursing Documentation Reviewed: Yes


Vital Signs Reviewed: Yes





- Physical Exam


Appears: Positive for: Non-toxic, No Acute Distress


Head Exam: Positive for: ATRAUMATIC, NORMAL INSPECTION, NORMOCEPHALIC


Skin: Positive for: Normal Color, Warm, DRY


Eye Exam: Positive for: EOMI, Normal appearance, PERRL


ENT: Positive for: Normal ENT Inspection


Neck: Positive for: Normal, Painless ROM


Cardiovascular/Chest: Positive for: Regular Rate, Rhythm


Respiratory: Positive for: CNT, Normal Breath Sounds


Gastrointestinal/Abdominal: Positive for: Normal Exam, Soft


Back: Positive for: Normal Inspection


Extremity: Positive for: Normal ROM


Neurologic/Psych: Positive for: Alert, Oriented





- Laboratory Results


Result Diagrams: 


                                 11/01/18 19:10





                                 11/01/18 19:10





- ECG


O2 Sat by Pulse Oximetry: 99





Disposition





- Clinical Impression


Clinical Impression: 


 Pleuritic chest pain








- Patient ED Disposition


Is Patient to be Admitted: No


Counseled Patient/Family Regarding: Studies Performed, Diagnosis, Need For 

Followup, Rx Given





- Disposition


Referrals: 


[facility] [Outside]


Disposition: Routine/Home


Disposition Time: 20:42


Condition: FAIR


Prescriptions: 


Naproxen [Naprosyn] 500 mg PO Q12H #20 tab


Instructions:  Pleuritic Chest Pain


Forms:  [facility] (English)

## 2018-11-02 NOTE — RAD
Date of service: 



11/01/2018



HISTORY:

 Chest pain 



COMPARISON:

Frontal chest radiograph 09/30/2018.



TECHNIQUE:

Chest PA and lateral



FINDINGS:



LUNGS:

No active pulmonary disease.



PLEURA:

No significant pleural effusion identified. No pneumothorax apparent.



CARDIOVASCULAR:

No aortic atherosclerotic calcification present.



Normal cardiac size. No pulmonary vascular congestion. 



OSSEOUS STRUCTURES:

No significant abnormalities.



VISUALIZED UPPER ABDOMEN:

Normal.



OTHER FINDINGS:

None.



IMPRESSION:

No interval acute cardiopulmonary disease appreciated.

## 2019-03-10 ENCOUNTER — HOSPITAL ENCOUNTER (EMERGENCY)
Dept: HOSPITAL 31 - C.ER | Age: 29
Discharge: HOME | End: 2019-03-10
Payer: MEDICAID

## 2019-03-10 VITALS — SYSTOLIC BLOOD PRESSURE: 108 MMHG | HEART RATE: 61 BPM | DIASTOLIC BLOOD PRESSURE: 70 MMHG

## 2019-03-10 VITALS — TEMPERATURE: 97.8 F | RESPIRATION RATE: 18 BRPM

## 2019-03-10 VITALS — OXYGEN SATURATION: 99 %

## 2019-03-10 VITALS — BODY MASS INDEX: 17.4 KG/M2

## 2019-03-10 DIAGNOSIS — K40.90: Primary | ICD-10-CM

## 2019-03-10 DIAGNOSIS — R10.30: ICD-10-CM

## 2019-03-10 LAB
ALBUMIN SERPL-MCNC: 4.4 G/DL (ref 3.5–5)
ALBUMIN/GLOB SERPL: 1.8 {RATIO} (ref 1–2.1)
ALT SERPL-CCNC: 16 U/L (ref 21–72)
AST SERPL-CCNC: 20 U/L (ref 17–59)
BASOPHILS # BLD AUTO: 0 K/UL (ref 0–0.2)
BASOPHILS NFR BLD: 1.1 % (ref 0–2)
BILIRUB UR-MCNC: NEGATIVE MG/DL
BUN SERPL-MCNC: 10 MG/DL (ref 9–20)
CALCIUM SERPL-MCNC: 9.1 MG/DL (ref 8.6–10.4)
EOSINOPHIL # BLD AUTO: 0.1 K/UL (ref 0–0.7)
EOSINOPHIL NFR BLD: 2.8 % (ref 0–4)
ERYTHROCYTE [DISTWIDTH] IN BLOOD BY AUTOMATED COUNT: 14.2 % (ref 11.5–14.5)
GFR NON-AFRICAN AMERICAN: > 60
GLUCOSE UR STRIP-MCNC: NORMAL MG/DL
HGB BLD-MCNC: 12.7 G/DL (ref 12–18)
LEUKOCYTE ESTERASE UR-ACNC: (no result) LEU/UL
LIPASE: 96 U/L (ref 23–300)
LYMPHOCYTES # BLD AUTO: 1.9 K/UL (ref 1–4.3)
LYMPHOCYTES NFR BLD AUTO: 45.5 % (ref 20–40)
MCH RBC QN AUTO: 31.5 PG (ref 27–31)
MCHC RBC AUTO-ENTMCNC: 32.9 G/DL (ref 33–37)
MCV RBC AUTO: 95.7 FL (ref 80–94)
MONOCYTES # BLD: 0.4 K/UL (ref 0–0.8)
MONOCYTES NFR BLD: 9.1 % (ref 0–10)
NEUTROPHILS # BLD: 1.7 K/UL (ref 1.8–7)
NEUTROPHILS NFR BLD AUTO: 41.5 % (ref 50–75)
NRBC BLD AUTO-RTO: 0.2 % (ref 0–2)
PH UR STRIP: 5 [PH] (ref 5–8)
PLATELET # BLD: 115 K/UL (ref 130–400)
PMV BLD AUTO: 10.3 FL (ref 7.2–11.7)
PROT UR STRIP-MCNC: NEGATIVE MG/DL
RBC # BLD AUTO: 4.02 MIL/UL (ref 4.4–5.9)
RBC # UR STRIP: NEGATIVE /UL
SP GR UR STRIP: 1.02 (ref 1–1.03)
UROBILINOGEN UR-MCNC: NORMAL MG/DL (ref 0.2–1)
WBC # BLD AUTO: 4.2 K/UL (ref 4.8–10.8)

## 2019-03-10 PROCEDURE — 83690 ASSAY OF LIPASE: CPT

## 2019-03-10 PROCEDURE — 80053 COMPREHEN METABOLIC PANEL: CPT

## 2019-03-10 PROCEDURE — 96374 THER/PROPH/DIAG INJ IV PUSH: CPT

## 2019-03-10 PROCEDURE — 99285 EMERGENCY DEPT VISIT HI MDM: CPT

## 2019-03-10 PROCEDURE — 74177 CT ABD & PELVIS W/CONTRAST: CPT

## 2019-03-10 PROCEDURE — 96375 TX/PRO/DX INJ NEW DRUG ADDON: CPT

## 2019-03-10 PROCEDURE — 85025 COMPLETE CBC W/AUTO DIFF WBC: CPT

## 2019-03-10 PROCEDURE — 81001 URINALYSIS AUTO W/SCOPE: CPT

## 2019-03-10 NOTE — C.PDOC
History Of Present Illness


28 year old male with history of asthma presents to ED complaining of lower 

abdominal pain for the past 6-7 months, with occasional nausea, vomiting, 

diarrhea, and poor appetite. Patient states that the last episode of vomiting 

was 3 days ago, and his last last bowel movement was yesterday. Patient rates 

pain as 9/10, worse when walking. Patient also reports swelling to his left 

abdominal area down to the inguinal area. Patient denies urinary symptoms, 

recent illnesses, fever, chills, chest pain, shortness of breath, weakness. 

Patient is able to ambulate well. Reports generalized body aches for the past 4-

5 months. Denies ever seeing physician for issues, nor does he take any pain 

medications or any OTC meds for his symptoms. Patient is a current smoker. 


Time Seen by Provider: 03/10/19 12:29


Chief Complaint (Nursing): Flu-like Symptoms


History Per: Patient


History/Exam Limitations: no limitations


Onset/Duration Of Symptoms: Other (6-7 months)


Current Symptoms Are (Timing): Still Present


Pain Scale Rating Of: 9


Location Of Pain/Discomfort: RLQ, LLQ


Quality Of Discomfort: "Pain"


Associated Symptoms: Nausea, Vomiting, Diarrhea, Loss Of Appetite.  denies: 

Fever, Chills, Chest Pain, Urinary Symptoms, Other (illnesses, shortness of 

breath, weakness)


Exacerbating Factors: Walking





Past Medical History


Reviewed: Historical Data, Nursing Documentation, Vital Signs


Vital Signs: 





                                Last Vital Signs











Temp  97.8 F   03/10/19 12:38


 


Pulse  65   03/10/19 12:38


 


Resp  18   03/10/19 12:38


 


BP  113/78   03/10/19 12:38


 


Pulse Ox  99   03/10/19 12:38














- Medical History


PMH: Anxiety, Asthma, Fractures (Right hand)


Surgical History: Appendectomy


Family History: States: No Known Family Hx





- Social History


Hx Tobacco Use: Yes


Hx Alcohol Use: No


Hx Substance Use: Yes





Review Of Systems


Constitutional: Negative for: Fever, Chills, Weakness


Cardiovascular: Negative for: Chest Pain


Respiratory: Negative for: Shortness of Breath


Gastrointestinal: Positive for: Nausea, Vomiting, Abdominal Pain, Diarrhea


Genitourinary: Negative for: Dysuria, Frequency, Incontinence


Skin: Negative for: Rash





Physical Exam





- Physical Exam


Appears: Non-toxic, In Acute Distress


Skin: Normal Color, Warm, Dry


Head: Atraumatic, Normacephalic


Eye(s): bilateral: Normal Inspection, PERRL, EOMI


Nose: Normal


Oral Mucosa: Moist


Neck: Normal, Supple


Chest: Symmetrical, No Tenderness


Cardiovascular: Rhythm Regular, No Murmur


Respiratory: Normal Breath Sounds, No Rales, No Rhonchi, No Wheezing


Gastrointestinal/Abdominal: Soft, Tenderness (RLQ and LLQ tenderness to 

palpation), Distention (to the left abdomen)


Male Genital: Inguinal Swelling (left-sided)


Extremity: Normal ROM


Neurological/Psych: Oriented x3, Normal Speech, Normal Cognition





ED Course And Treatment





- Laboratory Results


Result Diagrams: 


                                 03/10/19 13:30





                                 03/10/19 13:30


Lab Results: 





                                        











Total Bilirubin  0.3 mg/dL (0.2-1.3)   03/10/19  13:30    


 


AST  20 U/L (17-59)   03/10/19  13:30    


 


ALT  16 U/L (21-72)  L  03/10/19  13:30    


 


Alkaline Phosphatase  58 U/L ()   03/10/19  13:30    


 


Total Protein  6.8 g/dL (6.3-8.3)   03/10/19  13:30    


 


Albumin  4.4 g/dL (3.5-5.0)   03/10/19  13:30    


 


Globulin  2.4 gm/dL (2.2-3.9)   03/10/19  13:30    


 


Albumin/Globulin Ratio  1.8  (1.0-2.1)   03/10/19  13:30    








                                        











Lipase  96 U/L ()   03/10/19  13:30    








                                        











Urine Color  Yellow  (YELLOW)   03/10/19  13:30    


 


Urine Clarity  Clear  (Clear)   03/10/19  13:30    


 


Urine pH  5.0  (5.0-8.0)   03/10/19  13:30    


 


Ur Specific Gravity  1.017  (1.003-1.030)   03/10/19  13:30    


 


Urine Protein  Negative mg/dL (NEGATIVE)   03/10/19  13:30    


 


Urine Glucose (UA)  Normal mg/dL (Normal)   03/10/19  13:30    


 


Urine Ketones  Negative mg/dL (NEGATIVE)   03/10/19  13:30    


 


Urine Blood  Negative  (NEGATIVE)   03/10/19  13:30    


 


Urine Nitrate  Negative  (NEGATIVE)   03/10/19  13:30    


 


Urine Bilirubin  Negative  (NEGATIVE)   03/10/19  13:30    


 


Urine Urobilinogen  Normal mg/dL (0.2-1.0)   03/10/19  13:30    


 


Ur Leukocyte Esterase  Neg Korina/uL (Negative)   03/10/19  13:30    


 


Urine WBC (Auto)  2 /hpf (0-5)   03/10/19  13:30    


 


Urine RBC (Auto)  < 1 /hpf (0-3)   03/10/19  13:30    











O2 Sat by Pulse Oximetry: 99 (RA)


Pulse Ox Interpretation: Normal





Medical Decision Making


Medical Decision Making: 


r/o incarcerated left hernia 


Plan:


CT Abdomen and Pelvis


Chemistry


Bloodwork


Pepcid 20mg IVP


Zofran 8mg IVP 


Urinalysis





Disposition


Counseled Patient/Family Regarding: Studies Performed, Diagnosis, Need For 

Followup, Rx Given





- Disposition


Referrals: 


Abisai Grayson MD [Staff Provider] - 


Disposition: HOME/ ROUTINE


Disposition Time: 17:16


Condition: STABLE


Additional Instructions: 


Follow up with General Surgery for further evaluation


Take Motrin prn pain


Return to ED if symptoms worsen


Prescriptions: 


Ibuprofen [Motrin] 600 mg PO TID PRN #30 tab


 PRN Reason: Pain, Moderate (4-7)


Instructions:  Inguinal and Femoral (Groin) Hernias, Hernia Repair (DC)


Forms:  [facility] (English)





- Clinical Impression


Clinical Impression: 


 Abdominal wall pain, Left inguinal hernia








- PA / NP / Resident Statement


MD/DO has reviewed & agrees with the documentation as recorded.





- Scribe Statement


The provider has reviewed the documentation as recorded by the Scribe (Santhosh Roy)


All medical record entries made by the Scribe were at my direction and 

personally dictated by me. I have reviewed the chart and agree that the record 

accurately reflects my personal performance of the history, physical exam, 

medical decision making, and the department course for this patient. I have also

 personally directed, reviewed, and agree with the discharge instructions and 

disposition.

## 2019-03-10 NOTE — CT
Date of service: 



03/10/2019



PROCEDURE:  CT Abdomen and Pelvis with contrast



HISTORY:

LLQ abd pain



COMPARISON:

None.



TECHNIQUE:

Contrast dose: 100 mL of Visipaque 320 intravenously.



Axial and reformatted coronal and sagittal CT images of the abdomen 

and pelvis were obtained after IV and oral contrast administration.



Radiation dose:



Total exam DLP = 363.84 mGy-cm.



This CT exam was performed using one or more of the following dose 

reduction techniques: Automated exposure control, adjustment of the 

mA and/or kV according to patient size, and/or use of iterative 

reconstruction technique.



FINDINGS:



LOWER THORAX:

Unremarkable. 



LIVER:

Unremarkable. No gross lesion or ductal dilatation. 



GALLBLADDER AND BILE DUCTS:

Unremarkable. 



PANCREAS:

Unremarkable. No gross lesion or ductal dilatation.



SPLEEN:

Unremarkable. 



ADRENALS:

Unremarkable. No mass. 



KIDNEYS AND URETERS:

Unremarkable. No hydronephrosis. No solid mass. 



VASCULATURE:

Unremarkable. No aortic aneurysm. No aortic atherosclerotic 

calcification or mural plaque present.



BOWEL:

Unremarkable. No obstruction. No gross mural thickening. 



APPENDIX:

Evidence of acute appendicitis.  The appendix is not visualized. 



PERITONEUM:

Unremarkable. No free fluid. No free air. 



LYMPH NODES:

Unremarkable. No enlarged lymph nodes. 



BLADDER:

Unremarkable. 



REPRODUCTIVE:

Unremarkable. 



BONES:

No acute fracture. 



OTHER FINDINGS:

None.



IMPRESSION:

No evidence of acute pathology in the abdomen and pelvis noted in 

this exam.

## 2019-03-10 NOTE — C.PDOC
Time Seen by Provider: 03/10/19 12:29


Chief Complaint (Nursing): Flu-like Symptoms





Past Medical History


Vital Signs: 





                                Last Vital Signs











Temp  97.8 F   03/10/19 12:38


 


Pulse  65   03/10/19 12:38


 


Resp  18   03/10/19 12:38


 


BP  113/78   03/10/19 12:38


 


Pulse Ox  99   03/10/19 12:38














- Medical History


PMH: Anxiety, Asthma, Fractures (Right hand)


Surgical History: Appendectomy


Family History: States: Unknown Family Hx





- Social History


Hx Alcohol Use: No


Hx Substance Use: Yes





ED Course And Treatment


O2 Sat by Pulse Oximetry: 99





Disposition





- Disposition


Forms:  [facility] (English)

## 2019-04-11 ENCOUNTER — HOSPITAL ENCOUNTER (EMERGENCY)
Dept: HOSPITAL 31 - C.ER | Age: 29
Discharge: LEFT BEFORE DISCHARGE | End: 2019-04-11
Payer: COMMERCIAL

## 2019-04-11 VITALS — BODY MASS INDEX: 17.4 KG/M2

## 2019-04-11 DIAGNOSIS — Z02.89: Primary | ICD-10-CM

## 2019-04-11 DIAGNOSIS — Z00.00: ICD-10-CM
